# Patient Record
Sex: MALE | Race: WHITE | NOT HISPANIC OR LATINO
[De-identification: names, ages, dates, MRNs, and addresses within clinical notes are randomized per-mention and may not be internally consistent; named-entity substitution may affect disease eponyms.]

---

## 2024-07-24 ENCOUNTER — APPOINTMENT (OUTPATIENT)
Dept: ORTHOPEDIC SURGERY | Facility: CLINIC | Age: 59
End: 2024-07-24
Payer: COMMERCIAL

## 2024-07-24 DIAGNOSIS — S92.354A NONDISPLACED FRACTURE OF FIFTH METATARSAL BONE, RIGHT FOOT, INITIAL ENCOUNTER FOR CLOSED FRACTURE: ICD-10-CM

## 2024-07-24 PROBLEM — Z00.00 ENCOUNTER FOR PREVENTIVE HEALTH EXAMINATION: Status: ACTIVE | Noted: 2024-07-24

## 2024-07-24 PROCEDURE — 73630 X-RAY EXAM OF FOOT: CPT | Mod: RT

## 2024-07-24 PROCEDURE — 99203 OFFICE O/P NEW LOW 30 MIN: CPT

## 2024-07-24 NOTE — HISTORY OF PRESENT ILLNESS
[de-identified] :  Patient presents for evaluation on RT foot pain. Patient states he has had right foot pain for months, but over the past few days it has become isolated to the lateral aspect of his foot.  The pain has become so severe, he must always wear shoes with weight bearing otherwise it is intolerable for him to even touch the ground.  Prior to this right foot pain, he has been active running, sprint intervals, and playing pickle ball.

## 2024-07-24 NOTE — DISCUSSION/SUMMARY
[de-identified] : I reviewed patient's radiographs and discussed his condition and treatment options.  Given continued pain and difficulty weight bearing, I discussed my concern for metatarsal stress fracture and recommend obtaining MRI of the right foot.  Patient voiced understanding and agreement with the plan.

## 2024-07-24 NOTE — PHYSICAL EXAM
[4th] : 4th [5th] : 5th [4___] : plantar flexion 4[unfilled]/5 [2+] : posterior tibialis pulse: 2+ [] : patient ambulates without assistive device [Right] : right foot [There are no fractures, subluxations or dislocations. No significant abnormalities are seen] : There are no fractures, subluxations or dislocations. No significant abnormalities are seen

## 2024-08-14 ENCOUNTER — APPOINTMENT (OUTPATIENT)
Dept: ORTHOPEDIC SURGERY | Facility: CLINIC | Age: 59
End: 2024-08-14
Payer: COMMERCIAL

## 2024-08-14 DIAGNOSIS — G62.89 OTHER SPECIFIED POLYNEUROPATHIES: ICD-10-CM

## 2024-08-14 DIAGNOSIS — M79.671 PAIN IN RIGHT FOOT: ICD-10-CM

## 2024-08-14 PROCEDURE — 99213 OFFICE O/P EST LOW 20 MIN: CPT

## 2024-08-14 NOTE — DISCUSSION/SUMMARY
[de-identified] : I reviewed patient's right foot MRI and discussed his condition and treatment options.  May resume activities as tolerated, wearing supportive shoes.   Advised seeing primary care physician for further work up of peripheral neuropathy.  For episodes of sharp pain, advised applying ice and topical ointments rather than taking NSAIDs.  Patient voiced understanding and agreement with the plan.

## 2024-08-14 NOTE — DATA REVIEWED
[MRI] : MRI [Right] : of the right [Foot] : foot [Report was reviewed and noted in the chart] : The report was reviewed and noted in the chart [I independently reviewed and interpreted images and report] : I independently reviewed and interpreted images and report [I reviewed the films/CD and agree] : I reviewed the films/CD and agree [FreeTextEntry1] : No occult fracture of the right foot. Specifically, no fractures of the fifth metatarsal. 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

## 2024-08-14 NOTE — PHYSICAL EXAM
[4th] : 4th [5th] : 5th [4___] : plantar flexion 4[unfilled]/5 [2+] : posterior tibialis pulse: 2+ [Right] : right foot [There are no fractures, subluxations or dislocations. No significant abnormalities are seen] : There are no fractures, subluxations or dislocations. No significant abnormalities are seen [] : no swelling

## 2024-08-14 NOTE — HISTORY OF PRESENT ILLNESS
[de-identified] :  Patient presents for MRI results. Patient reports he is unable to walk bare foot like he normally does and must wear shoes. Still having pain, specifically annoying tingling sensations.